# Patient Record
Sex: MALE | Race: WHITE | NOT HISPANIC OR LATINO | Employment: UNEMPLOYED | URBAN - METROPOLITAN AREA
[De-identification: names, ages, dates, MRNs, and addresses within clinical notes are randomized per-mention and may not be internally consistent; named-entity substitution may affect disease eponyms.]

---
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
## 2021-01-04 ENCOUNTER — TELEMEDICINE (OUTPATIENT)
Dept: FAMILY MEDICINE CLINIC | Facility: CLINIC | Age: 21
End: 2021-01-04
Payer: MEDICAID

## 2021-01-04 DIAGNOSIS — G47.00 INSOMNIA, UNSPECIFIED TYPE: Primary | ICD-10-CM

## 2021-01-04 PROCEDURE — 99213 OFFICE O/P EST LOW 20 MIN: CPT | Performed by: FAMILY MEDICINE

## 2021-01-04 NOTE — PROGRESS NOTES
Virtual Regular Visit    Assessment/Plan:    Problem List Items Addressed This Visit     None      Visit Diagnoses     Insomnia, unspecified type    -  Primary        Patient advised on sleep hygiene  Patient is to cut out all caffeine intake as well  He should try the doxylamine medication at night time  Follow up in 2 days if symptoms are still persistent  Patient will need to come into the office for possible etiologies of symptoms if he is not improved  Reason for visit is No chief complaint on file  Encounter provider Deshawn Sky DO    Provider located at [address] [place] [id number]      Recent Visits  No visits were found meeting these conditions  Showing recent visits within past 7 days and meeting all other requirements     Future Appointments  No visits were found meeting these conditions  Showing future appointments within next 150 days and meeting all other requirements        The patient was identified by name and date of birth  Aneesh Nguyen was informed that this is a telemedicine visit and that the visit is being conducted through [employer] and patient was informed that this is not a secure, HIPAA-compliant platform  He agrees to proceed     My office door was closed  No one else was in the room  He acknowledged consent and understanding of privacy and security of the video platform  The patient has agreed to participate and understands they can discontinue the visit at any time  Patient is aware this is a billable service  Subjective  Aneesh Nguyen is a 24 y o  male with no past medical history that presents to the office with insomnia for the past 3 days  Patient states he goes to bed around 11 pm when he is tired but ends up staying up all night and getting a couple hours of sleep  Patient denies stress, anxiety or any additional symptoms  He takes no other medications   Patient has tried melatonin and doxylamine which have both not helped with his sleep  He tool the doxylamine this morning  Patient states that he typically turns off all the light and tv when he goes to bed  He sometimes will have the phone on during sleep  Patient denies drug use but state he takes caffeine (soda) everyday, which is something he has been doing for a long time but has never caused him these types of problems  He denies any other associated issues  HPI     No past medical history on file  Past Surgical History:   Procedure Laterality Date    APPENDECTOMY         No current outpatient medications on file  No current facility-administered medications for this visit  Allergies   Allergen Reactions    Seasonal Ic [Cholestatin]      pollen       Review of Systems   Constitutional: Negative for fever and unexpected weight change  HENT: Negative for congestion and dental problem  Eyes: Negative for discharge and itching  Respiratory: Negative for choking and chest tightness  Cardiovascular: Negative for chest pain and palpitations  Gastrointestinal: Negative for abdominal distention and abdominal pain  Endocrine: Negative for cold intolerance and heat intolerance  Genitourinary: Negative for difficulty urinating and dysuria  Musculoskeletal: Negative for arthralgias and back pain  Skin: Negative  Neurological: Negative for dizziness, facial asymmetry and headaches  Insomnia      Psychiatric/Behavioral: Negative for confusion, sleep disturbance and suicidal ideas  Video Exam    There were no vitals filed for this visit  Physical Exam     I spent 20 minutes directly with the patient during this visit      [address] acknowledges that he has consented to an online visit or consultation   He understands that the online visit is based solely on information provided by him, and that, in the absence of a face-to-face physical evaluation by the physician, the diagnosis he receives is both limited and provisional in terms of accuracy and completeness  This is not intended to replace a full medical face-to-face evaluation by the physician  Sharon Storm understands and accepts these terms

## 2021-09-13 ENCOUNTER — OFFICE VISIT (OUTPATIENT)
Dept: FAMILY MEDICINE CLINIC | Facility: CLINIC | Age: 21
End: 2021-09-13
Payer: MEDICAID

## 2021-09-13 VITALS
SYSTOLIC BLOOD PRESSURE: 122 MMHG | BODY MASS INDEX: 26.73 KG/M2 | OXYGEN SATURATION: 96 % | WEIGHT: 156.6 LBS | HEIGHT: 64 IN | RESPIRATION RATE: 18 BRPM | TEMPERATURE: 97.8 F | HEART RATE: 76 BPM | DIASTOLIC BLOOD PRESSURE: 74 MMHG

## 2021-09-13 DIAGNOSIS — Z23 ENCOUNTER FOR IMMUNIZATION: ICD-10-CM

## 2021-09-13 DIAGNOSIS — Z00.00 ANNUAL PHYSICAL EXAM: Primary | ICD-10-CM

## 2021-09-13 PROCEDURE — 90471 IMMUNIZATION ADMIN: CPT | Performed by: FAMILY MEDICINE

## 2021-09-13 PROCEDURE — 90686 IIV4 VACC NO PRSV 0.5 ML IM: CPT | Performed by: FAMILY MEDICINE

## 2021-09-13 PROCEDURE — 99395 PREV VISIT EST AGE 18-39: CPT | Performed by: FAMILY MEDICINE

## 2021-09-13 NOTE — PROGRESS NOTES
[address] [facility]    NAME: Saira Hylton  AGE: 24 y o  SEX: male  : 2000     DATE: 2021     Assessment and Plan:   23 yo M adopted with FHx of schizophrenia and obesity from her mother's end  Reassured patient that his lines are simply stretch marks  Encouraged patient to cut down on soda intake and add some physical exercise in his daily routine  Problem List Items Addressed This Visit     None      Visit Diagnoses     Annual physical exam    -  Primary    Encounter for immunization        Relevant Orders    influenza vaccine, quadrivalent, 0 5 mL, preservative-free, for adult and pediatric patients 6 mos+ (AFLURIA, FLUARIX, FLULAVAL, FLUZONE) (Completed)          Immunizations and preventive care screenings were discussed with patient today  Appropriate education was printed on patient's after visit summary  Counseling:  Alcohol/drug use: discussed moderation in alcohol intake, the recommendations for healthy alcohol use, and avoidance of illicit drug use  Dental Health: discussed importance of regular tooth brushing, flossing, and dental visits  Injury prevention: discussed safety/seat belts, safety helmets, smoke detectors, carbon dioxide detectors, and smoking near bedding or upholstery  Exercise: the importance of regular exercise/physical activity was discussed  Recommend exercise 3-5 times per week for at least 30 minutes  · Discussed with patient the benefits, contraindications and side effects of the following vaccines: Influenza   Discussed 1 component of the vaccine/s  BMI Counseling: Body mass index is 27 09 kg/m²   The BMI is above normal  Nutrition recommendations include encouraging healthy choices of fruits and vegetables, decreasing fast food intake, limiting drinks that contain sugar, moderation in carbohydrate intake, increasing intake of lean protein, reducing intake of saturated and trans fat and reducing intake of cholesterol  Exercise recommendations include exercising 3-5 times per week  No follow-ups on file  Chief Complaint:     Chief Complaint   Patient presents with    Annual Exam     Flu shot  History of Present Illness:      Adult Annual Physical   Patient here for a comprehensive physical exam      Dark lines on skin of both thigh:   - No itchy  - No pain  - Onset: about a year ago  - Progression: "theres more of them than before "     Diet and Physical Activity  · Diet/Nutrition: poor diet, frequent junk food, high fat diet and limited fruits/vegetables  · microwaveable foods and sodas, willing to cut down on soda and drink water  · Exercise: no formal exercise  Depression Screening  PHQ-9 Depression Screening    PHQ-9:   Frequency of the following problems over the past two weeks:      Little interest or pleasure in doing things: 0 - not at all  Feeling down, depressed, or hopeless: 0 - not at all  PHQ-2 Score: 0       General Health  · Sleep: sleeps well, gets 7-8 hours of sleep on average and sleeps between 4 AM to noon  · Hearing: normal - bilateral   · Vision: most recent eye exam <1 year ago and wears glasses  · Dental: regular dental visits, brushes teeth twice daily and occasional flossing and mouthwash  · Smoking: N  · Drinking: N  · Drugs Use: N     [facility]  · History of STDs?: no      Review of Systems:     Review of Systems   Past Medical History:     History reviewed  No pertinent past medical history     Past Surgical History:     Past Surgical History:   Procedure Laterality Date    APPENDECTOMY        Social History:     Social History     Socioeconomic History    Marital status: Single     Spouse name: None    Number of children: None    Years of education: None    Highest education level: None   Occupational History    None   Tobacco Use    Smoking status: Never Smoker    Smokeless tobacco: Never Used   Substance and Sexual Activity    Alcohol use: Never    Drug use: Never    Sexual activity: None   Other Topics Concern    None   Social History Narrative    None     Social Determinants of Health     Financial Resource Strain:     Difficulty of Paying Living Expenses:    Food Insecurity:     Worried About Running Out of Food in the Last Year:     [address] in the Last Year:    Transportation Needs:     Lack of Transportation (Medical):  Lack of Transportation (Non-Medical):    Physical Activity:     Days of Exercise per Week:     Minutes of Exercise per Session:    Stress:     Feeling of Stress :    Social Connections:     Frequency of Communication with Friends and Family:     Frequency of Social Gatherings with Friends and Family:     Attends Buddhism Services:     Active Member of Clubs or Organizations:     Attends Club or Organization Meetings:     Marital Status:    Intimate Partner Violence:     Fear of Current or Ex-Partner:     Emotionally Abused:     Physically Abused:     Sexually Abused:       Family History:     Family History   Adopted: Yes   Problem Relation Age of Onset    Schizophrenia Mother     Obesity Mother       Current Medications:     No current outpatient medications on file  No current facility-administered medications for this visit  Allergies: Allergies   Allergen Reactions    Seasonal Ic [Cholestatin]      pollen      Physical Exam:     /74 (BP Location: Left arm, Patient Position: Sitting, Cuff Size: Standard)   Pulse 76   Temp 97 8 °F (36 6 °C) (Tympanic)   Resp 18   Ht 5' 3 75" (1 619 m)   Wt 71 kg (156 lb 9 6 oz)   SpO2 96%   BMI 27 09 kg/m²     Physical Exam  Vitals and nursing note reviewed  Constitutional:       General: He is not in acute distress  Appearance: Normal appearance  He is normal weight  He is not ill-appearing or diaphoretic  HENT:      Head: Normocephalic        Right Ear: Tympanic membrane, ear canal and external ear normal  There is no impacted cerumen  Left Ear: Tympanic membrane and external ear normal  There is impacted cerumen  Nose: Nose normal  No congestion or rhinorrhea  Mouth/Throat:      Mouth: Mucous membranes are moist       Pharynx: Oropharynx is clear  No oropharyngeal exudate  Eyes:      General: No scleral icterus  Right eye: No discharge  Left eye: No discharge  Extraocular Movements: Extraocular movements intact  Conjunctiva/sclera: Conjunctivae normal       Pupils: Pupils are equal, round, and reactive to light  Cardiovascular:      Rate and Rhythm: Normal rate  Pulses: Normal pulses  Pulmonary:      Effort: Pulmonary effort is normal  No respiratory distress  Breath sounds: Normal breath sounds  No stridor  No wheezing, rhonchi or rales  Abdominal:      General: Abdomen is flat  Bowel sounds are normal  There is no distension  Palpations: Abdomen is soft  Tenderness: There is no abdominal tenderness  There is no guarding or rebound  Hernia: No hernia is present  Genitourinary:     Penis: Normal        Testes: Normal    Musculoskeletal:         General: No swelling, deformity or signs of injury  Cervical back: Normal range of motion and neck supple  Right lower leg: No edema  Left lower leg: No edema  Lymphadenopathy:      Cervical: No cervical adenopathy  Skin:     General: Skin is warm  Capillary Refill: Capillary refill takes less than 2 seconds  Comments: Dark stretch marks on proximal thigh bilaterally  Pictures on media    Neurological:      General: No focal deficit present  Mental Status: He is alert  Cranial Nerves: No cranial nerve deficit  Motor: No weakness        Coordination: Coordination normal       Gait: Gait normal    Psychiatric:         Mood and Affect: Mood normal          Behavior: Behavior normal           Pam Clement MD   [address] [place] [address]

## 2021-09-13 NOTE — PATIENT INSTRUCTIONS

## 2022-09-29 ENCOUNTER — SOCIAL WORK (OUTPATIENT)
Dept: BEHAVIORAL/MENTAL HEALTH CLINIC | Facility: CLINIC | Age: 22
End: 2022-09-29
Payer: MEDICAID

## 2022-09-29 DIAGNOSIS — F43.22 ADJUSTMENT DISORDER WITH ANXIETY: Primary | ICD-10-CM

## 2022-09-29 PROCEDURE — 90791 PSYCH DIAGNOSTIC EVALUATION: CPT | Performed by: SOCIAL WORKER

## 2022-09-29 NOTE — PSYCH
This note was not shared with the patient due to this is a psychotherapy note    Assessment/Plan:     F43 23  Adjustment disorder with anxiety  Subjective:     Patient ID: Anneliese Cuellar is a 22 y o  male who presented for an initial outpatient individual counseling session  Jadon Almendarez is well known to the undersigned therapist from a previous referral in private practice, the last time the undersigned therapist provided counseling to Jadon Almendarez was August 2019  The undersigned therapist referred Jadon Almendarez to [address] [place] [address] for outpatient counseling services due to his Medicaid insurance which is not accepted at private practice  Jadon Almendarez has a significant history of oppositional and defiant behavior, possible ADHD and autism  Today, Jadon Almendarez reports that he hasn't had a physical or medical appointment since September 13, 2021  For today's session, the undersigned therapist provided Jadon Almendarez with an orientation to [employer] services by summarizing the counseling experience, counseling process, and philosophy of treatment  Discussed [address] [place] [address] policies and procedures and paid special attention to reviewing the limitations of confidentiality and emergency polices  Began the process of establishing rapport and gaining a thorough understanding of the presenting problem by completing a comprehensive psychosocial assessment  Jadon Almendarez denies depression or anxiety but he feels stressed  There was no evidence of a thought disorder or psychosis  He denied suicidal ideation, gesture or plan  Jadon Almendarez dropped out of high school due to behavioral issues and then was enrolled in [employer] in order to obtain his GED  He left [employer] due to behavioral problems and remained at home playing video games during the Covid pandemic  Jadon Almendarez eventually earned his GED by attending virtual classes    Following counseling with the undersigned therapist in August 2019, Jadon Almendarez participated in outpatient counseling with a female counselor in [place]  According to his mother, Osbaldo Harkins would often be on his phone during sessions and after about four months, he no longer participated in counseling there  Recently, Osbaldo Harkins verbalized the desire to resume counseling with the undersigned therapist precipitated by his enrollment at [employer] and feels his need for additional  therapeutic support due to the difficult adjustment with starting college  Prior to attending college, Osbaldo Harkins was socially isolated and had minimal interaction with peers outside of the home  He is currently enrolled in four college courses after being pressured by his mother to attend college  Osbaldo Harkins has been voluntarily using free tutoring at school and reports that his grades are "good' so far  He denies use of alcohol, marijuana or any other illicit drugs  Osbaldo Harkins is not currently prescribed psychotropic medication  He reports meeting a few peers at school and states no social issues  Osbaldo Harkins has no prior work history  He was adopted by his parents after being removed from his biological parent's care  His biological mother has significant chronic mental health issues  Osbaldo Harkins visits his biological parents about twice per year  His biological mother is diagnosed with schizophrenia and is declining physically  His biological father was an alcoholic  Americo's adoptive parents have been  for many years  He doesn't like his adoptive father and has very little contact with him  Americo's adoptive mother is disabled  She was a nurse and is currently working taking care of elderly people  His mother isn't currently dating  The undersigned therapist recommended that Osbaldo Harkins participate in outpatient counseling once per week     Due to the undersigned therapist's booked schedule, Osbaldo Harkins was given the option of scheduling an initial session with the Abdias Farias in-house female therapist       HPI:   The undersigned therapist provided Zoya Valero with 60 minutes of psychotherapy  Start Visit 9:05 AM to 10:05 AM End time  TOTAL SESSION TIME:  60 minutes    Pre-morbid level of function and History of Present Illness: see above  Previous Psychiatric/psychological treatment/year: see above  Current Psychiatrist/Therapist: see above  Outpatient and/or Partial and Other Community Resources Used (CTT, ICM, VNA): Outpatient Outpatient      Problem Assessment:     SOCIAL/VOCATION:  Family Constellation (include parents, relationship with each and pertinent Psych/Medical History):     Family History   Adopted: Yes   Problem Relation Age of Onset   • Schizophrenia Mother    • Obesity Mother        Mother: see above  Spouse: none  Father: see above  Children: none  Sibling: none  Sibling: none  Children: none  Other: see above  Zoya Valero relates best to his mother  He lives with his mother  He does not live alone  Domestic Violence: No past history of domestic violence    Additional Comments related to family/relationships/peer support: see above  School or Work History (strengths/limitations/needs): See above  Her highest grade level achieved was 12th   history includes none  Financial status includes See above  LEISURE ASSESSMENT (Include past and present hobbies/interests and level of involvement (Ex: Group/Club Affiliations): plays [employer] games  his primary language is [place]  Preferred language is [place]  Ethnic considerations are not identified  Religions affiliations and level of involvement none   Does spirituality help you cope? No    FUNCTIONAL STATUS: There has been a recent change in Zoya Valero ability to do the following: fully functional    Level of Assistance Needed/By Whom?: N/A    Zoya Valero learns best by  not assessed      SUBSTANCE ABUSE ASSESSMENT: no substance abuse    Substance/Route/Age/Amount/Frequency/Last Use: N/A    DETOX HISTORY: no history    Previous detox/rehab treatment: N/A    HEALTH ASSESSMENT: no referral to PCP needed    LEGAL: see medical record    Prenatal History: unknown    Delivery History: unknown    Developmental Milestones: N/A  Temperament as an infant was unknown  Temperament as a toddler was unknown  Temperament at school age was unknown  Temperament as a teenager was oppositional and defiant  Risk Assessment:   The following ratings are based on my review of records, observation, and clinical interview  Risk of Harm to Self:   Demographic risk factors include   Historical Risk Factors include history of impulsive behaviors  Recent Specific Risk Factors include none  Additional Factors for a Child or Adolescent gender: male (more likely to succeed)    Risk of Harm to Others:   Demographic Risk Factors include male  Historical Risk Factors include victim of childhood bullying  Recent Specific Risk Factors include multiple stressors    Access to Weapons:   Venkat Oreilly has access to the following weapons: none  The following steps have been taken to ensure weapons are properly secured: N/A    Based on the above information, the client presents the following risk of harm to self or others:  low    The following interventions are recommended:   no intervention changes    Notes regarding this Risk Assessment: Venkat Denilson denied suicidal ideation, gesture or plan          Review Of Systems:     Mood Anxiety   Behavior Normal    Thought Content Normal   General Normal    Personality Normal   Other Psych Symptoms Normal   Constitutional Normal and As Noted in HPI   ENT As Noted in HPI   Cardiovascular As Noted in HPI   Respiratory As Noted in HPI   Gastrointestinal As Noted in HPI   Genitourinary As Noted in HPI   Musculoskeletal As Noted in HPI   Integumentary As Noted in HPI   Neurological As Noted in HPI   Endocrine As Noted in HPI         Mental status:  Appearance poor eye contact    Mood mood appropriate   Affect affect was flat   Speech a normal rate Thought Processes normal thought processes   Hallucinations no hallucinations present    Thought Content no delusions   Abnormal Thoughts no suicidal thoughts    Orientation  place and time   Remote Memory short term memory intact   Attention Span concentration impaired   Intellect Appears to be of Average Intelligence   Fund of Knowledge displays adequate knowledge of current events   Insight Poor insight    Judgement judgment was impaired   Muscle Strength see medical record   Language no difficulty naming common objects   Pain none   Pain Scale 0

## 2022-10-14 ENCOUNTER — SOCIAL WORK (OUTPATIENT)
Dept: BEHAVIORAL/MENTAL HEALTH CLINIC | Facility: CLINIC | Age: 22
End: 2022-10-14
Payer: MEDICAID

## 2022-10-14 DIAGNOSIS — F43.22 ADJUSTMENT DISORDER WITH ANXIETY: Primary | ICD-10-CM

## 2022-10-14 PROCEDURE — 90834 PSYTX W PT 45 MINUTES: CPT | Performed by: SOCIAL WORKER

## 2022-10-14 NOTE — PSYCH
This note was not shared with the patient due to this is a psychotherapy note    Assessment/Plan:     F43 23 Adjustment disorder with anxiety  Subjective:    Patient ID: Amanda Min is a 25 y o  male who presented for a follow-up outpatient individual counseling session  He denies depression but feels anxious about his grades and overall adjustment to the college expertience  Evelin Camargo admits to feeling stressed but hasn't missed any classes  He is getting 100% financial aid for college tuition  Evelin Camargo is not behind in his school work, or handing in any late assignments  He is struggling in [place] as he currently has a 75  Evelin Camargo is more worried about completing his writing assignments in his English class  He has been accessing in-school tutoring service for academic assistance  Evelin Camargo admits to cheating on his Biology tests because he doesn't like the teacher  The undersigned therapist provided Evelin Camargo with supportive counseling and encouragement secondary to his adjustment to college life as well as completing all of his assignments  HPI:  Visit Time    Visit Start Time: 9:10 AM  Visit Stop Time: 10:08 AM  Total Visit Duration: 58 minutes    Review of Systems      Objective:  Evelin Camargo presented for today's session with a cooperative attitude and was easily engaged in the therapeutic process  His mood was stressed with an affect that was congruent to topic  There was no evidence of a thought disorder or psychosis  He denied suicidal ideation, gesture or plan  Evelin Camargo denied depression but has felt anxious and stressed throughout the week       Physical Exam

## 2022-11-25 ENCOUNTER — OFFICE VISIT (OUTPATIENT)
Dept: FAMILY MEDICINE CLINIC | Facility: CLINIC | Age: 22
End: 2022-11-25

## 2022-11-25 VITALS
RESPIRATION RATE: 18 BRPM | HEIGHT: 64 IN | WEIGHT: 166.9 LBS | HEART RATE: 85 BPM | DIASTOLIC BLOOD PRESSURE: 60 MMHG | TEMPERATURE: 97.9 F | SYSTOLIC BLOOD PRESSURE: 110 MMHG | BODY MASS INDEX: 28.49 KG/M2 | OXYGEN SATURATION: 97 %

## 2022-11-25 DIAGNOSIS — Z00.00 ANNUAL PHYSICAL EXAM: Primary | ICD-10-CM

## 2022-11-25 NOTE — PROGRESS NOTES
[address] [facility]    NAME: Aneesh Nguyen  AGE: 25 y o  SEX: male  : 2000     DATE: 2022     Assessment and Plan:     Problem List Items Addressed This Visit    None  Visit Diagnoses     Annual physical exam    -  Primary          Immunizations and preventive care screenings were discussed with patient today  Appropriate education was printed on patient's after visit summary  Counseling:  Alcohol/drug use: discussed moderation in alcohol intake, the recommendations for healthy alcohol use, and avoidance of illicit drug use  Dental Health: discussed importance of regular tooth brushing, flossing, and dental visits  Injury prevention: discussed safety/seat belts, safety helmets, smoke detectors, carbon dioxide detectors, and smoking near bedding or upholstery  Sexual health: discussed sexually transmitted diseases, partner selection, use of condoms, avoidance of unintended pregnancy, and contraceptive alternatives  · Exercise: the importance of regular exercise/physical activity was discussed  Recommend exercise 3-5 times per week for at least 30 minutes  Depression Screening and Follow-up Plan: Patient was screened for depression during today's encounter  They screened negative with a PHQ-2 score of 0  No follow-ups on file  Chief Complaint:     Chief Complaint   Patient presents with   • Annual Exam      History of Present Illness:     Adult Annual Physical   Patient here for a comprehensive physical exam  The patient reports problems - stretch marks on inner thighs  Discussed at last year visit  Patient advised to try vitamin E containg lotion, can try collagen supplement advised no proven benefit but may be helpful       Patient now in [email or web address] at [employer]  He is in first semester, he is stressed with college   He is majoring in Biology, he would like to work with animals in the future  Diet and Physical Activity  · Diet/Nutrition: poor diet, frequent junk food and limited fruits/vegetables  · Exercise: no formal exercise  Depression Screening  PHQ-2/9 Depression Screening    Little interest or pleasure in doing things: 0 - not at all  Feeling down, depressed, or hopeless: 0 - not at all       [address]  · Sleep: sleeps well and sleeps 5-7 hours per night on average      · Hearing: normal - bilateral   · Vision: most recent eye exam >1 year ago and wears glasses  · Dental: regular dental visits, does not brush teeth regularly and does not floss  Brushes teeth once per week   Health  · History of STDs?: no      Review of Systems:     Review of Systems   Past Medical History:     History reviewed  No pertinent past medical history  Past Surgical History:     Past Surgical History:   Procedure Laterality Date   • APPENDECTOMY        Social History:     Social History     Socioeconomic History   • Marital status: Single     Spouse name: None   • Number of children: None   • Years of education: None   • Highest education level: None   Occupational History   • None   Tobacco Use   • Smoking status: Never   • Smokeless tobacco: Never   Vaping Use   • Vaping Use: Never used   Substance and Sexual Activity   • Alcohol use: Never   • Drug use: Never   • Sexual activity: None   Other Topics Concern   • None   Social History Narrative   • None     Social Determinants of Health     Financial Resource Strain: Not on file   Food Insecurity: Not on file   Transportation Needs: Not on file   Physical Activity: Not on file   Stress: Not on file   Social Connections: Not on file   Intimate Partner Violence: Not on file   Housing Stability: Not on file      Family History:     Family History   Adopted: Yes   Problem Relation Age of Onset   • Schizophrenia Mother    • Obesity Mother       Current Medications:     No current outpatient medications on file       No current facility-administered medications for this visit  Allergies: Allergies   Allergen Reactions   • Seasonal Ic [Cholestatin]      pollen      Physical Exam:     /60 (BP Location: Left arm, Patient Position: Sitting, Cuff Size: Standard)   Pulse 85   Temp 97 9 °F (36 6 °C) (Tympanic Core)   Resp 18   Wt 75 7 kg (166 lb 14 4 oz)   SpO2 97%   BMI 28 87 kg/m²     Physical Exam  Constitutional:       General: He is not in acute distress  HENT:      Right Ear: There is impacted cerumen  Left Ear: There is impacted cerumen  Cardiovascular:      Rate and Rhythm: Normal rate and regular rhythm  Pulses: Normal pulses  Heart sounds: Normal heart sounds  No murmur heard  No friction rub  No gallop  Pulmonary:      Effort: Pulmonary effort is normal  No respiratory distress  Breath sounds: Normal breath sounds  No wheezing or rhonchi  Abdominal:      General: Abdomen is flat  Bowel sounds are normal  There is no distension  Palpations: Abdomen is soft  Tenderness: There is no abdominal tenderness  Musculoskeletal:      Right lower leg: No edema  Left lower leg: No edema  Skin:     Capillary Refill: Capillary refill takes less than 2 seconds  Neurological:      Mental Status: He is alert and oriented to person, place, and time            Harrell Gaucher, MD   [address] [place] [address]

## 2022-11-25 NOTE — PATIENT INSTRUCTIONS

## 2023-03-03 ENCOUNTER — OFFICE VISIT (OUTPATIENT)
Dept: FAMILY MEDICINE CLINIC | Facility: CLINIC | Age: 23
End: 2023-03-03

## 2023-03-03 VITALS
TEMPERATURE: 96.6 F | BODY MASS INDEX: 28.94 KG/M2 | DIASTOLIC BLOOD PRESSURE: 70 MMHG | SYSTOLIC BLOOD PRESSURE: 124 MMHG | HEIGHT: 65 IN | OXYGEN SATURATION: 96 % | HEART RATE: 83 BPM | WEIGHT: 173.7 LBS

## 2023-03-03 DIAGNOSIS — H61.23 BILATERAL IMPACTED CERUMEN: Primary | ICD-10-CM

## 2023-03-03 NOTE — PROGRESS NOTES
Mauricio Luong 2000 male MRN: [id number]    Family Medicine Acute Visit    ASSESSMENT/PLAN  1  Bilateral impacted cerumen  Ear cerumen removal    Date/Time: 3/3/2023 2:25 PM  Performed by: Vanessa Velez MD  Authorized by: Becca Nam MD   Universal Protocol:  Consent: Verbal consent obtained  Consent given by: patient  Patient understanding: patient states understanding of the procedure being performed  Patient consent: the patient's understanding of the procedure matches consent given  Procedure consent: procedure consent matches procedure scheduled      Patient location:  Clinic  Procedure details:     Local anesthetic:  None    Location:  R ear and L ear    Procedure type: irrigation only      Equipment used:  Water mixed with hydrogen peroxide, 60 cc syringe with flexible ear tip  Post-procedure details:     Complication:  None    Hearing quality:  Improved    Patient tolerance of procedure: Tolerated well, no immediate complications  Comments:      He had significant impacted cerumen bilaterally  Both ears were completely cleaned out and both tympanic membranes were visualized  No immediate complications with exception of mild dizziness that he felt after the procedure  Patient's symptoms of dizziness improved and he stated he felt better and was walking towards the exit without difficulty  RTO if symptoms worsen  No future appointments  SUBJECTIVE  CC: Ear Fullness (Patient C/o Right Ear discomfort x 1 week  Describes as a "clogged/muffled" feeling  He has tried OTC Ear drops states which has worsen his sx's  )      HPI:  Mauricio Luong is a 25 y o  male who presents for hearing loss/ear fullness  Ear fullness/muffled hearing  Is here for evaluation of ear fullness and muffled hearing that started a week ago  He has been using OTC eardrops to soften up the earwax feels this has not helped but worsened his symptoms    He is also tried using Q-tips which he feels has also not worked  He denies any fever, congestion, runny nose, cough  Review of Systems    Historical Information   The patient history was reviewed as follows:  No past medical history on file  Past Surgical History:   Procedure Laterality Date   • APPENDECTOMY       Family History   Adopted: Yes   Problem Relation Age of Onset   • Schizophrenia Mother    • Obesity Mother       Social History   Social History     Substance and Sexual Activity   Alcohol Use Never     Social History     Substance and Sexual Activity   Drug Use Never     Social History     Tobacco Use   Smoking Status Never   Smokeless Tobacco Never       Medications:   No current outpatient medications on file  Allergies   Allergen Reactions   • Seasonal Ic [Cholestatin]      pollen       OBJECTIVE  Vitals:   Vitals:    03/03/23 1413   BP: 124/70   BP Location: Left arm   Patient Position: Sitting   Cuff Size: Adult   Pulse: 83   Temp: (!) 96 6 °F (35 9 °C)   TempSrc: Tympanic   SpO2: 96%   Weight: 78 8 kg (173 lb 11 2 oz)   Height: 5' 5" (1 651 m)         Physical Exam  Vitals reviewed  Constitutional:       General: He is awake  He is not in acute distress  HENT:      Head: Normocephalic  Right Ear: There is impacted cerumen  Left Ear: There is impacted cerumen  Pulmonary:      Effort: No respiratory distress  Musculoskeletal:      Cervical back: Neck supple  Neurological:      Mental Status: He is alert and easily aroused  Psychiatric:         Behavior: Behavior is cooperative                Roby Snellen, [address] [place] [address]   3/3/2023

## 2024-05-09 ENCOUNTER — TELEPHONE (OUTPATIENT)
Age: 24
End: 2024-05-09

## 2024-05-09 NOTE — TELEPHONE ENCOUNTER
Contacted Patient to set up an annual physical per Dr. Velasquez's request.  No answer left a message.